# Patient Record
Sex: MALE | Race: BLACK OR AFRICAN AMERICAN | Employment: FULL TIME | URBAN - METROPOLITAN AREA
[De-identification: names, ages, dates, MRNs, and addresses within clinical notes are randomized per-mention and may not be internally consistent; named-entity substitution may affect disease eponyms.]

---

## 2018-04-08 ENCOUNTER — TRANSFERRED RECORDS (OUTPATIENT)
Dept: HEALTH INFORMATION MANAGEMENT | Facility: CLINIC | Age: 28
End: 2018-04-08

## 2018-04-08 LAB
CREAT SERPL-MCNC: 0.83 MG/DL (ref 0.7–1.3)
GFR SERPL CREATININE-BSD FRML MDRD: >60 ML/MIN/1.73M2
GLUCOSE SERPL-MCNC: 214 MG/DL (ref 70–125)
POTASSIUM SERPL-SCNC: 3.7 MMOL/L (ref 3.5–5)
TSH SERPL-ACNC: 1.41 UIU/ML (ref 0.3–5)

## 2018-04-10 ENCOUNTER — HOSPITAL ENCOUNTER (INPATIENT)
Facility: CLINIC | Age: 28
LOS: 2 days | Discharge: HOME | DRG: 885 | End: 2018-04-12
Attending: PSYCHIATRY & NEUROLOGY | Admitting: PSYCHIATRY & NEUROLOGY
Payer: COMMERCIAL

## 2018-04-10 DIAGNOSIS — F25.0 SCHIZOAFFECTIVE DISORDER, BIPOLAR TYPE (H): ICD-10-CM

## 2018-04-10 DIAGNOSIS — E11.9 TYPE 2 DIABETES MELLITUS WITHOUT COMPLICATION, WITHOUT LONG-TERM CURRENT USE OF INSULIN (H): Primary | ICD-10-CM

## 2018-04-10 DIAGNOSIS — E55.9 VITAMIN D DEFICIENCY: ICD-10-CM

## 2018-04-10 PROBLEM — T14.91XA SUICIDE ATTEMPT (H): Status: ACTIVE | Noted: 2018-04-10

## 2018-04-10 LAB
CHOLEST SERPL-MCNC: 146 MG/DL
CREAT SERPL-MCNC: 0.69 MG/DL (ref 0.66–1.25)
DEPRECATED CALCIDIOL+CALCIFEROL SERPL-MC: 13 UG/L (ref 20–75)
GFR SERPL CREATININE-BSD FRML MDRD: >90 ML/MIN/1.7M2
GLUCOSE BLDC GLUCOMTR-MCNC: 111 MG/DL (ref 70–99)
GLUCOSE BLDC GLUCOMTR-MCNC: 213 MG/DL (ref 70–99)
HBA1C MFR BLD: 7.9 % (ref 0–6.4)
HDLC SERPL-MCNC: 35 MG/DL
LDLC SERPL CALC-MCNC: 74 MG/DL
NONHDLC SERPL-MCNC: 111 MG/DL
TRIGL SERPL-MCNC: 186 MG/DL

## 2018-04-10 PROCEDURE — 36415 COLL VENOUS BLD VENIPUNCTURE: CPT | Performed by: STUDENT IN AN ORGANIZED HEALTH CARE EDUCATION/TRAINING PROGRAM

## 2018-04-10 PROCEDURE — 82565 ASSAY OF CREATININE: CPT | Performed by: STUDENT IN AN ORGANIZED HEALTH CARE EDUCATION/TRAINING PROGRAM

## 2018-04-10 PROCEDURE — 00000146 ZZHCL STATISTIC GLUCOSE BY METER IP

## 2018-04-10 PROCEDURE — 99221 1ST HOSP IP/OBS SF/LOW 40: CPT | Performed by: PHYSICIAN ASSISTANT

## 2018-04-10 PROCEDURE — 99223 1ST HOSP IP/OBS HIGH 75: CPT | Mod: AI | Performed by: PSYCHIATRY & NEUROLOGY

## 2018-04-10 PROCEDURE — 90853 GROUP PSYCHOTHERAPY: CPT

## 2018-04-10 PROCEDURE — 93005 ELECTROCARDIOGRAM TRACING: CPT

## 2018-04-10 PROCEDURE — 80061 LIPID PANEL: CPT | Performed by: STUDENT IN AN ORGANIZED HEALTH CARE EDUCATION/TRAINING PROGRAM

## 2018-04-10 PROCEDURE — 12400007 ZZH R&B MH INTERMEDIATE UMMC

## 2018-04-10 PROCEDURE — 25000132 ZZH RX MED GY IP 250 OP 250 PS 637

## 2018-04-10 PROCEDURE — 82306 VITAMIN D 25 HYDROXY: CPT | Performed by: STUDENT IN AN ORGANIZED HEALTH CARE EDUCATION/TRAINING PROGRAM

## 2018-04-10 PROCEDURE — 83036 HEMOGLOBIN GLYCOSYLATED A1C: CPT | Performed by: STUDENT IN AN ORGANIZED HEALTH CARE EDUCATION/TRAINING PROGRAM

## 2018-04-10 PROCEDURE — 25000132 ZZH RX MED GY IP 250 OP 250 PS 637: Performed by: STUDENT IN AN ORGANIZED HEALTH CARE EDUCATION/TRAINING PROGRAM

## 2018-04-10 PROCEDURE — 99207 ZZC CONSULT E&M CHANGED TO INITIAL LEVEL: CPT | Performed by: PHYSICIAN ASSISTANT

## 2018-04-10 RX ORDER — DEXTROSE MONOHYDRATE 25 G/50ML
25-50 INJECTION, SOLUTION INTRAVENOUS
Status: DISCONTINUED | OUTPATIENT
Start: 2018-04-10 | End: 2018-04-12 | Stop reason: HOSPADM

## 2018-04-10 RX ORDER — ACETAMINOPHEN 325 MG/1
650 TABLET ORAL EVERY 4 HOURS PRN
Status: DISCONTINUED | OUTPATIENT
Start: 2018-04-10 | End: 2018-04-12 | Stop reason: HOSPADM

## 2018-04-10 RX ORDER — NICOTINE POLACRILEX 4 MG
15-30 LOZENGE BUCCAL
Status: DISCONTINUED | OUTPATIENT
Start: 2018-04-10 | End: 2018-04-12 | Stop reason: HOSPADM

## 2018-04-10 RX ORDER — OLANZAPINE 10 MG/2ML
10 INJECTION, POWDER, FOR SOLUTION INTRAMUSCULAR
Status: DISCONTINUED | OUTPATIENT
Start: 2018-04-10 | End: 2018-04-11

## 2018-04-10 RX ORDER — ARIPIPRAZOLE 10 MG/1
10 TABLET ORAL DAILY
Status: DISCONTINUED | OUTPATIENT
Start: 2018-04-10 | End: 2018-04-11

## 2018-04-10 RX ORDER — OLANZAPINE 10 MG/1
10 TABLET ORAL
Status: DISCONTINUED | OUTPATIENT
Start: 2018-04-10 | End: 2018-04-11

## 2018-04-10 RX ORDER — HYDROXYZINE HYDROCHLORIDE 25 MG/1
25 TABLET, FILM COATED ORAL EVERY 4 HOURS PRN
Status: DISCONTINUED | OUTPATIENT
Start: 2018-04-10 | End: 2018-04-12 | Stop reason: HOSPADM

## 2018-04-10 RX ORDER — ARIPIPRAZOLE 5 MG/1
5 TABLET ORAL AT BEDTIME
Status: DISCONTINUED | OUTPATIENT
Start: 2018-04-10 | End: 2018-04-10

## 2018-04-10 RX ORDER — ERGOCALCIFEROL 1.25 MG/1
50000 CAPSULE, LIQUID FILLED ORAL
Status: DISCONTINUED | OUTPATIENT
Start: 2018-04-11 | End: 2018-04-12 | Stop reason: HOSPADM

## 2018-04-10 RX ORDER — AMLODIPINE BESYLATE 5 MG/1
5 TABLET ORAL DAILY
Status: DISCONTINUED | OUTPATIENT
Start: 2018-04-10 | End: 2018-04-11

## 2018-04-10 RX ADMIN — METFORMIN HYDROCHLORIDE 500 MG: 500 TABLET ORAL at 08:53

## 2018-04-10 RX ADMIN — METFORMIN HYDROCHLORIDE 500 MG: 500 TABLET ORAL at 17:42

## 2018-04-10 RX ADMIN — AMLODIPINE BESYLATE 5 MG: 5 TABLET ORAL at 08:52

## 2018-04-10 RX ADMIN — ARIPIPRAZOLE 10 MG: 10 TABLET ORAL at 17:42

## 2018-04-10 NOTE — PROGRESS NOTES
Initially seen by OT on this date. Jae attended 2 of 2 OT groups today. He participated in a Health and Wellbeing discussion group this a.m. on self-care activities to promote ongoing recovery. Pt.completed a worksheet and participated in a group discussion. Engaged in the discussion, thoughtful and relevant responses. Will be given a written self-assessment upon increased group attendance. More observation needed to complete initial evaluation at this time.

## 2018-04-10 NOTE — CONSULTS
Internal Medicine Initial Visit    Jae Winter MRN# [id number]   Age: 27 year old YOB: 1990   Date of Admission: 4/10/2018    ADMIT DATE: 4/10/2018  DATE OF CONSULT: 4/10/2018    PCP: No primary care provider on file.    REQUESTING SERVICE: psychiatry  REASON FOR CONSULT: DM2, HTN- assess and make recommendations    CHIEF COMPLAINT: Depression    HPI: Jae Winter is a 27 year old male with a past medical history of DM2, HTN, PILLO, depression, schizoaffective d/o bipolar type who is admitted to [facility] after suicide attempt w/ carbon monoxide poisoning. Initially evaluated in [facility] ED and medically cleared.    The patient notes he stopped taking his medications 3 months ago as one of them made him feel drowsy and he needs to be alert to drive. He notes that he just stopped all of his medications. He doesn't frequently check his BG. Denies any new medical concerns. He denies chest pain, dyspnea, abdominal pain, nausea/vomiting, dysuria.     ROS:   10 point ROS asked and otherwise negative, with exceptions as noted above in HPI.     PMH:  HTN  DM2  Bipolar Disorder    PSH:  No past surgical history on file.    MEDICATIONS    No current facility-administered medications on file prior to encounter.   No current outpatient prescriptions on file prior to encounter.    ALLERGIES:   No Known Allergies    FAMILY HISTORY:  Reviewed and updated in [employer].     SOCIAL HISTORY:  Social History     Social History     Marital status:      Spouse name: N/A     Number of children: N/A     Years of education: N/A     Social History Main Topics     Smoking status: Not on file     Smokeless tobacco: Not on file     Alcohol use Not on file     Drug use: Not on file     Sexual activity: Not on file     Other Topics Concern     Not on file     Social History Narrative   Denies smoking, occasional alcohol use, denies drug use.     PHYSICAL EXAM:  Blood pressure 138/73, pulse 74, temperature 96.6  F (35.9  " C), resp. rate 14, height 1.854 m (6' 1\"), weight 88.7 kg (195 lb 8 oz), SpO2 99 %.    GENERAL: Alert and orientated x 3. Appears in no acute distress.   HEENT: Anicteric sclera. Mucous membranes moist and without lesions.   CV: RRR, S1S2, no murmurs appreciated.  RESPIRATORY: Respirations regular, even, and unlabored. Lungs CTAB with no wheezing, rales, rhonchi.   GI: Soft and non distended with normoactive bowel sounds present in all quadrants. No tenderness, rebound, guarding.   EXTREMITIES: No peripheral edema. 2+ bilateral pedal pulses.   NEUROLOGIC: CN II-XII grossly intact. No focal deficits.   MUSCULOSKELETAL: No joint swelling or tenderness.   SKIN: No jaundice. No rashes or lesions.     LABS:  CMP    Recent Labs  Lab 04/10/18  0825   CR 0.69   GFRESTIMATED >90   GFRESTBLACK >90     CBCNo lab results found in last 7 days.  INRNo lab results found in last 7 days.    IMAGING: None to review from this admission    ASSESSMENT & RECOMMENDATIONS:  #Depression, anxiety, Schizoaffective disorder bipolar type: Utox negative at OSH.   -Management per psychiatry  #Suicide attempt w/ carbon monoxide poisoning: OSH labs w/ carboxyhemoglobin of 2.2%. Cleared [facility] ED. Mentation appropriate. Management per psychiatry.  #DM2: PTA maintained on Metformin 500 mg BID. BG of 125 prior to transfer, 111 today. A1C 6.9 in 07/2017, 7.9 today, notes no medications for >3 months.   -Restart metformin at 500 mg BID  -BID BG checks  -Notify medicine if persistently >180, can increase metformin to 1000 mg BID  #HTN: PTA maintained on amlodipine 5 mg. Previously also prescribed chlorthalidone 25 mg, lisinopril 20 mg qday but no longer taking. /73 on admission. Goal <130/80 due to DM2.   -Continue PTA medication  -Notify medicine if BP persistently >150/90   -If BP elevated will consider addition of ACE  #Dyslipidemia: TG of 186, tchol of 146, LDL of 74, HDL 35. F/U     I appreciate the opportunity to participate in the care of " this patient. Medicine will sign off, please notify on call DU if any intercurrent medical issues arise.     Yuni Chavez PA-C
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
## 2018-04-10 NOTE — PLAN OF CARE
Problem: Patient Care Overview  Goal: Team Discussion  Team Plan:   BEHAVIORAL TEAM DISCUSSION    Participants:  Daisy Mclaughlin [facility], Dr. Cali Doherty MD, Kevin Vogel MD, Adela Funez RN  Progress: Initiated with hospitalization   Continued Stay Criteria/Rationale: Admitted after calling the crisis line during a suicide attempt in the context of non adherence to medication and psychosocial stressors.   Medical/Physical: See H&P  Precautions:   Behavioral Orders   Procedures     Code 1 - Restrict to Unit     Routine Programming     As clinically indicated     Self Injury Precaution     Status 15     Every 15 minutes.     Suicide precautions     Patients on Suicide Precautions should have a Combination Diet ordered that includes a Diet selection(s) AND a Behavioral Tray selection for Safe Tray - with utensils, or Safe Tray - NO utensils       Plan: Continue to monitor patient's symptoms as a medication regiment is initiated and document changes until proper discharge is set in place.  Rationale for change in precautions or plan: No change has been indicated.

## 2018-04-10 NOTE — PLAN OF CARE
"Problem: Depressive Symptoms  Goal: Depressive Symptoms  Signs and symptoms of listed problems will be absent or manageable.  Outcome: No Change  Marcos is a 27 year old male admitted to [facility] from [facility] via EMS for suicide attempt by means of attempted carbon monoxide poisoning. Pt voluntary. Search completed. VSS. Pt calm, cooperative. Presents sad with flat depressed affect.     Pt admitted following attempting suicide this past evening but reports regretting attempt at this time, stating he was \"just overwhelmed, stressed, and did something stupid\". Pt denies SI at admission. Pt reports previous history of suicide attempt, unknown number however greater than 5, most occurring in childhood ages [age] by means of drinking bleach, overdosing on pills, and jumping out a second story window. Pt reports suicide attempt in 2015 by means of carbon monoxide poisoning similar to this past evening. Pt denies past mental health history, however per report pt has hx of schizoaffective bipolar type, anxiety, and depression. Pt denied past  admissions, however report indicated mental health admission at [facility] in 2015 and hx of commitment from March to April 2016. Pt quit taking all his medications several months ago due to their cost (not covered by insurance) and feeling drowsy, which he cannot experience due to being a .     Pt reports current stressors of his landlord selling the property the reside in, his wife being laid off and being pregnant with their second child (has 1 year old at home) and unable to find work due to being pregnant, baby due next month. Pt reports that he is unable to find housing in a safe neighborhood due to his single income and poor credit. Pt reports several friends recently dying or going to retirement. Pt states he has been hinting about his SI but no one is listening or understanding. Pt also reports that he has told his wife he is suicidal, however she does not know " how to respond.     NKDA. UTOX negative. Pt has diabetes type two, controlled when taking his metformin. Pt has not had influenza vaccine and declines at this time.     Pt concerned with what to tell his employer in regards to his current hospitalization, not wanting to disclose reason. Pt encouraged to discuss discrete note from MD.

## 2018-04-10 NOTE — PROGRESS NOTES
"  ----------------------------------------------------------------------------------------------------------  [facility], [place]   Psychiatric Progress Note  Hospital Day #0     Interim History:   The patient's care was discussed with the treatment team and chart notes were reviewed.  Sleep: Patient admitted early this morning  PRNs: None    Staff report: Visible and attending groups. Keeps to self. Appetite is good. No thoughts of harming himself.     Patient interview: Patient was seen in the AM in the conference room. Patient was pleasant and cooperative.    Patient explained the circumstances surrounding his admission. He reports several months of depression and increased \"life stressors.\" He states that he has been stressed because he and his wife are expecting another baby in a month, they are being forced to move by June because the [employer] is selling the property, and his wife is being laid off as of May 1st. He describes feeling overwhelmed by life and that he couldn't take the stress anymore. He has felt suicidal for the past several months, but has been able to push the thoughts away. Two nights ago he described not being able to handle his thoughts anymore so he drove his car away from home, put a garden hose from his exhaust pipe and into the car and then taped the window so that the exhaust couldn't leak out. He also drank some alcohol and nyquil with the intent of sleeping through the attempt. Marcos says he became frustrated that the attempt wasn't working, and that at a certain point he called the crisis hotline. He is now glad he survived the attempt and has no current thoughts of suicide or desire to die. He also taped a video on his phone \"venting about life\" which he likens to a suicide note of sorts. He later deleted it. His biggest motivator to be alive is his family. Over the past several months he notes feelings of sadness, anhedonia, feeling tired with no energy, " "and poor self esteem. He loves his job as a , but lately has not felt motivation to get up and go to work. His hobbies include music, singing, and rapping, however, he has not gotten any pleasure from these lately. He put a rap video online last week which got good reviews, so that made him happy briefly, but then he started having low self esteem again. He says \"I don't even like looking at myself in the mirror.\" He feels bad about himself physically and emotionally. He thinks he has been stress eating over the past several months and reports a weight gain of 50-60 lbs, and then recently a deliberate weight loss of 10-20 lbs. He says his sleep has been fine-has not noted an increase or decrease in sleep. Marcos says he has been sharing with his wife and brother that he is depressed, but that they are not as supportive as he would like, \"they try, but they don't know what to say or how to handle it.\" He has not been taking his medications (wellbutrin, risperdal, blood pressure meds, and metformin) due to sedation. Since he is a  this is a huge issue, \"I can't fall asleep behind the wheel.\" He says he wasn't sure which one was causing sedation so he stopped all of them. He does not have a good relationship with his outpatient provider because he thinks she only cares about medications and doesn't care about him. Previously tried therapy but didn't click well with his therapist.     HPI details his psychiatric history which includes alee and psychosis. He denies any symptoms of psychosis or alee since 2016 and says he is \"embarrassed\" by his previous behavior. He says the hospitalization in 2016 was in the context of heavy marijuana use. He described feeling paranoid and having \"crazy thoughts\" and hallucinations at the time. He also had elevated mood with no sleep, periods of irritability, and irresponsible spending. He says he spent $500 buying things for strangers at the store, \"I was feeling " "very generous.\" He has not had any of these symptoms since then. He believes his mom has bipolar disorder, but has not observed her manic. He says she attempted suicide in 2008. He also has an great aunt who is \"delusional.\"    He is interested in remaining voluntarily hospitalized for medication management and stabilization. He has no thoughts of harming himself or others at this time. We discussed medication options at length including abilify versus lithium.   The risks, benefits, alternatives and side effects of any medication changes have been discussed and are understood by the patient and other caregivers. Ultimately, Marcos decided that he would prefer to go with abilify because of desire to avoid sedation and weight gain.    Collateral: Spoke with patient's wife Audrey with Marcos's permission. She echos many of the same sentiments as Marcos including him being depressed over the past several months. He never made any suicidal statements to her and she was shocked by the attempt. The only strange thing she noted was him texting her \"f**k music, f**k life\" last Friday. When he came home he apologized and said \"the depression took over.\" She states that the increased stress due to her impending unemployment, their needing to move, and expecting a baby have contributed to his mood. She thinks he has taken a lot of the stress on himself to avoid putting stress on her. Her main concern is his having not slept well over the past several months. She says he has only been sleeping a few hours per night \"and he tosses and turns.\" She thinks he can't sleep because of stressful thoughts. Things have also been difficult because he works day shifts and she works night shifts. She shares that he stopped his medication due to sedating side effects and that he didn't think they were helping his mood. She has not observed any psychotic or manic symptoms and does not think he needs the risperdal. She thinks he needs an " "anti-depressant and to get a new therapist and doctor. They were trying to find him a new doctor but were having difficulties with insurance. They have a support system that includes his brother and his mother. His mother recently moved in to help with the childcare.          Psychiatric Examination:   /73  Pulse 70  Temp 96.6  F (35.9  C)  Resp 14  Ht 1.854 m (6' 1\")  Wt 88.7 kg (195 lb 8 oz)  SpO2 99%  BMI 25.79 kg/m2  Weight is 195 lbs 8 oz  Body mass index is 25.79 kg/(m^2).    Appearance:  awake, alert, adequately groomed, dressed in hospital scrubs and appeared as age stated  Attitude:  cooperative  Eye Contact:  good  Mood:  \"I'm depressed\"  Affect:  mood congruent, intensity is normal and full range  Speech:  clear, coherent  Psychomotor Behavior:  no evidence of tardive dyskinesia, dystonia, or tics and intact station, gait and muscle tone  Thought Process:  logical, linear and goal oriented  Associations:  no loose associations  Thought Content:  Denies AH and VH. Denies current SI, but SI was recently present  Insight:  good  Judgment:  intact  Oriented to:  time, person, and place  Attention Span and Concentration:  intact  Recent and Remote Memory:  intact  Language: Conversant in English  Fund of Knowledge: appropriate  Muscle Strength and Tone: normal  Gait and Station: Normal     Assessment    Jae Winter is a 27 year old male with a history of Schizoaffective Disorder, Bipolar Type who presented to the [facility] ED following suicide attempt via carbon monoxide poisoning in the context of worsening depression. The patient's last psychiatric hospitalization was in 2016.  The patient is currently followed by OPP Tali Segura, however has not seen her since August of 2017 . Family history is notable for psychosis in an aunt. Current psychosocial stressors include unstable housing and impending birth of his third child. The patient denies drug abuse or self injurious " behaviors. The MSE is notable for calm, cooperative patient with constricted affect. The patient's reported symptoms of depression, anhedonia and SI are consistent with historic diagnosis of Schizoaffective Disorder, bipolar type, current episode depressed.      Inpatient psychiatric hospitalization is needed due to suicidal ideation with recent suicide attempt.     Hospital course: Jae Winter was admitted to [facility] as a voluntary patient for hospitalization following a suicide attempt. Patient regretted his attempt and was no longer suicidal, but was amenable to stabilization and treatment for depression. After discussing the risk and benefits of medications of abilify and lithium, patient decided on abilify. Patient was started on abilify and titrated up to a final dose of 10 mg.     Medical course: Patient's prior to admission medications that he had not been taking were restarted including metformin and amlodipine. Medicine was consulted for management of his diabetes and hypertension.    Plan     Admit to [facility] with Attending Physician Cali Doherty MD  Legal Status: Voluntary     Safety Assessment:     Behavioral Checks      Routine Programming      Code 1 - Restrict to Unit      Status 15       Medications:   - Abilify 10 mg daily  - Hydroxyzine 25 mg q4 hrs PRN  - Olanzapine 10 mg q2 hrs PRN     Principal psychiatric diagnosis: Schizoaffective Disorder, bipolar type, current episode depressed vs bipolar disorder current episode depressed  - Patient will be treated in therapeutic milieu with appropriate individual and group therapies.     Medical diagnoses:    # S/p suicide attempt w/carbon monoxide poisoning:  Carboxyhemoglobin of 2.2% at outside hospital. Medically cleared for admission    # DMII:   A1C of 7.9 on admission in the context of medication nonadherence  - Continue Metformin 500 mg bid with meals  - BIG BG checks  - Will notify medicine if persistently >180    # HTN:  - Continue PTA  amlodipine 5 mg daily  - Will notify medicine if BP persistently >150/90    # Vitamin D deficiency:  Vitamin D of 13 on admission  - Start ergocalciferol 50,000 IUs weekly  - Labs: Hemoglobin A1c 7.9, vitamin D low at 13, TGs elevated at 186    # Dyslipidemia:  Cholesterol 146, triglycerides 186, HDL 35, and LDL 74  - F/U as outpatient with PCP       Consults: Internal Medicine        Dispo: unknown pending medication management and clinical stabilization    Safety Assessment:   Behavioral Orders   Procedures     Code 1 - Restrict to Unit     Routine Programming     As clinically indicated     Self Injury Precaution     Status 15     Every 15 minutes.     Suicide precautions     Patients on Suicide Precautions should have a Combination Diet ordered that includes a Diet selection(s) AND a Behavioral Tray selection for Safe Tray - with utensils, or Safe Tray - NO utensils       -------------------------------------------------------------------------------------  IAntonia, MS3, acted as a scribe for Dr. Kevin Vogel, PGY1.    I have reviewed and edited the documentation recorded by the scribe.  This documentation accurately reflects the services I personally performed and treatment decisions made by me in consultation with the attending physician Cali Doherty MD.    Kevin Vogel MD  Psychiatry PGY-1  [phone]      Attestation:  I, Cali Doherty, have personally performed an examination of this patient and I have reviewed the resident's documentation.  I have edited the note to reflect all relevant changes.  I have discussed this patient with the house staff on 4/10/2018.  I agree with resident findings and plan in today's note and yesterdays resident H&P.  I have reviewed all vitals and laboratory findings.      I certifiy that the inpatient services were ordered in accordance with the Medicare regulations governing the order. This includes certification that hospital inpatient services are reasonable  and necessary and in the case of services not specified as inpatient-only under 42 .22(n), that they are appropriately provided as inpatient services in accordance with the 2-midnight benchmark under 42 .3(e).     The reason for inpatient status is Suicide attempt.    Cali Doherty MD             Labs/Imaging since admission:         Lipid panel   Result Value Ref Range    Cholesterol 146 <200 mg/dL    Triglycerides 186 (H) <150 mg/dL    HDL Cholesterol 35 (L) >39 mg/dL    LDL Cholesterol Calculated 74 <100 mg/dL    Non HDL Cholesterol 111 <130 mg/dL   Vitamin D   Result Value Ref Range    Vitamin D Deficiency screening 13 (L) 20 - 75 ug/L   Hemoglobin A1c   Result Value Ref Range    Hemoglobin A1C 7.9 (H) 0 - 6.4 %   Creatinine   Result Value Ref Range    Creatinine 0.69 0.66 - 1.25 mg/dL    GFR Estimate >90 >60 mL/min/1.7m2    GFR Estimate If Black >90 >60 mL/min/1.7m2   Glucose by meter   Result Value Ref Range    Glucose 111 (H) 70 - 99 mg/dL   EKG 12-lead, complete   Result Value Ref Range    Interpretation ECG Click View Image link to view waveform and result

## 2018-04-10 NOTE — PROGRESS NOTES
Patient visible and is attending groups. Appetite good. Offers no complaints. Keeping to self.         04/10/18 1417   Behavioral Health   Hallucinations denies / not responding to hallucinations   Thinking distractable   Orientation person: oriented;place: oriented   Memory baseline memory   Insight admits / accepts   Judgement impaired   Eye Contact at examiner   Affect blunted, flat   Mood anxious   Physical Appearance/Attire attire appropriate to age and situation;neat   Hygiene (Patient showered today.)   1. Wish to be Dead No   2. Non-Specific Active Suicidal Thoughts  No   Activity (Acclimating to unit.)   Speech clear;coherent   Safety   Suicidality Status 15

## 2018-04-10 NOTE — PROGRESS NOTES
Clinical Nutrition Services Brief Note     Jae is a 27 year old male seen by the dietitian today due to a positive admission nutrition risk screen for reduced oral intake over the past month.     Nutrition History:  Pt reports he does not remember telling any staff members on admit that he had a poor appetite. He reports at home he typically watches how much he eats and had been losing weight intentionally. He states sometimes it is a challenge to eat well for his type 2 diabetes because his family isn't always very supportive.     Nutrition Intervention:  Nutrition education - discussed intakes PTA and currently. Pt reports he is doing well and has no concerns.     Nutrition Follow-up/Monitoring:  RD to sign off as no nutrition follow-up warranted at this time.  Please consult if further needs arise prior to discharge.    Breanne Tovar RD, LD  Pager #[phone]

## 2018-04-10 NOTE — PROGRESS NOTES
04/10/18 0537   Patient Belongings   Did you bring any home meds/supplements to the hospital?  Yes  (Nurse to verify all medication)   Disposition of meds  Sent to security/pharmacy per site process   Patient Belongings clothing;shoes   Disposition of Belongings Locker   Belongings Search Yes   Clothing Search Yes   Second Staff Roberto and Dheeraj LATHAM   General Info Comment Patient came in with a shelly pant, sweater, tank top, alana shirt, sock, and tennis shoe. There was no money, no wallet, no cell phone and no ID found in belongings.   A               Admission:  I am responsible for any personal items that are not sent to the safe or pharmacy.  [facility] is not responsible for loss, theft or damage of any property in my possession.    Signature:  _________________________________ Date: _______  Time: _____                                              Staff Signature:  ____________________________ Date: ________  Time: _____      2nd Staff person, if patient is unable/unwilling to sign:    Signature: ________________________________ Date: ________  Time: _____     Discharge:  [facility] has returned all of my personal belongings:    Signature: _________________________________ Date: ________  Time: _____                                          Staff Signature:  ____________________________ Date: ________  Time: _____

## 2018-04-10 NOTE — IP AVS SNAPSHOT
MRN:[id number]                      After Visit Summary   4/10/2018    Jae Winter    MRN: [id number]           Thank you!     Thank you for choosing [facility] for your care. Our goal is always to provide you with excellent care.        Patient Information     Date Of Birth          1990        Designated Caregiver       Most Recent Value    Caregiver    Will someone help with your care after discharge? no      About your hospital stay     You were admitted on:  April 10, 2018 You last received care in the:  [facility]    You were discharged on:  April 12, 2018       Who to Call     For medical emergencies, please call 911.  For non-urgent questions about your medical care, please call your primary care provider or clinic, None          Attending Provider     Provider Specialty    Cali Doherty MD Psychiatry       Primary Care Provider    None Specified      Further instructions from your care team        Behavioral Discharge Planning and Instructions      Summary:  You were admitted on 4/10/2018  due to Suicide Attempt.  You were treated by Dr. Cali Doherty MD and discharged on 4/12/18 from [facility] to Home    Principal Diagnosis: Schizoaffective Disorder     Health Care Follow-up Appointments:     Dr. Turner- Tuesday April 17th 3:00pm (Psychiatry)   [facility]  [address] [place] [address] [address] [place] [address]  Phone:  [phone] Fax:  [phone]     Mr. Mckenzie- Thursday April 19th 2:05pm (Primary Care Doctor)  [facility]    [address]nd [place] [address]   Ph: [phone] Fax:    Attend all scheduled appointments with your outpatient providers. Call at least 24 hours in advance if you need to reschedule an appointment to ensure continued access to your outpatient providers.   Major Treatments, Procedures and Findings:  You were provided with: a psychiatric assessment, medication evaluation and/or management, group  "therapy and milieu management    Symptoms to Report: increased confusion, losing more sleep, mood getting worse or thoughts of suicide    Early warning signs can include: increased depression or anxiety increased thoughts or behaviors of suicide or self-harm  increased unusual thinking, such as paranoia or hearing voices    Safety and Wellness:  Take all medicines as directed.  Make no changes unless your doctor suggests them.      Follow treatment recommendations.  Refrain from alcohol and non-prescribed drugs.  If there is a concern for safety, call 911.    Resources:   Crisis Intervention: 424.953.2186 or 089-510-9856 (TTY: 818.113.9826).  Call anytime for help.  National [place] on Mental Illness (www.mn.nilsa.org): 253.157.4648 or 879-293-5457.  Suicide Awareness Voices of Education (SAVE) (www.save.org): 200-170-GTVE (1511)  National Suicide Prevention Line (www.mentalhealthmn.org): 135-336-TYPU (9052)  Mental Health Consumer/Survivor Network of [place] (www.mhcsn.net): 517.984.2067 or 393-759-4635  Mental Health Association of [place] (www.mentalhealth.org): 180.496.4710 or 361-538-0922  [facility] Crisis Response - Adult [phone]  Text 4 Life: txt \"LIFE\" to 10106 for immediate support and crisis intervention  Crisis text line: Text \"[place]\" to 121732. Free, confidential, 24/7.  Crisis Intervention: 478.904.7888 or 190-953-5213. Call anytime for help.   [facility] Mental Health Crisis Response Team - Child: [phone]    The treatment team has appreciated the opportunity to work with you.     If you have any questions or concerns our unit number is [phone]  You may be receiving a follow-up phone call within the next three days from a representative from behavioral health.            Pending Results     No orders found from 4/8/2018 to 4/11/2018.            Admission Information     Date & Time Provider Department Dept. Phone    4/10/2018 Cali Doherty MD [facility] [phone]      Your " "Vitals Were     Blood Pressure Pulse Temperature Respirations Height Weight    96/42 61 97.5  F (36.4  C) 12 1.854 m (6' 1\") 89.1 kg (196 lb 8 oz)    Pulse Oximetry BMI (Body Mass Index)                99% 25.93 kg/m2          Myntra Information     Myntra lets you send messages to your doctor, view your test results, renew your prescriptions, schedule appointments and more. To sign up, go to [email or web address] . Click on \"Log in\" on the left side of the screen, which will take you to the Welcome page. Then click on \"Sign up Now\" on the right side of the page.     You will be asked to enter the access code listed below, as well as some personal information. Please follow the directions to create your username and password.     Your access code is: [email or web address]  Expires: 2018  1:22 PM     Your access code will  in 90 days. If you need help or a new code, please call your [facility] clinic or [phone].        Care EveryWhere ID     This is your Care EveryWhere ID. This could be used by other organizations to access your [facility] medical records  [id number]        Equal Access to Services     MIGEL LIN AH: Tanisha Foley, waaxda luqadaha, qaybta kaalmada adeegyada, yu serrano. So [facility] [phone].    ATENCIÓN: Si habla español, tiene a thibodeaux disposición servicios gratuitos de asistencia lingüística. Llame al [phone].    We comply with applicable federal civil rights laws and [place] laws. We do not discriminate on the basis of race, color, national origin, age, disability, sex, sexual orientation, or gender identity.               Review of your medicines      START taking        Dose / Directions    ARIPiprazole 5 MG tablet   Commonly known as:  ABILIFY   Used for:  Schizoaffective disorder, bipolar type (H)        Dose:  5 mg   Start taking on:  2018   Take 1 tablet (5 mg) by mouth daily   Quantity:  30 tablet   Refills:  0       DRISDOL " 05774 units Caps   Used for:  Vitamin D deficiency        Dose:  21498 Units   Start taking on:  4/18/2018   Take 50,000 Units by mouth every 7 days   Quantity:  7 capsule   Refills:  0       metFORMIN 500 MG tablet   Commonly known as:  GLUCOPHAGE   Used for:  Type 2 diabetes mellitus without complication, without long-term current use of insulin (H)        Dose:  500 mg   Take 1 tablet (500 mg) by mouth 2 times daily (with meals)   Quantity:  60 tablet   Refills:  0            Where to get your medicines      These medications were sent to [facility] [place], [place] - [address]  [address] [address]     Phone:  [phone]     ARIPiprazole 5 MG tablet    DRISDOL 28134 units Caps    metFORMIN 500 MG tablet                Protect others around you: Learn how to safely use, store and throw away your medicines at www.disposemymeds.org.             Medication List: This is a list of all your medications and when to take them. Check marks below indicate your daily home schedule. Keep this list as a reference.      Medications           Morning Afternoon Evening Bedtime As Needed    ARIPiprazole 5 MG tablet   Commonly known as:  ABILIFY   Take 1 tablet (5 mg) by mouth daily   Start taking on:  4/13/2018   Last time this was given:  5 mg on 4/12/2018  8:55 AM                                DRISDOL 04230 units Caps   Take 50,000 Units by mouth every 7 days   Start taking on:  4/18/2018   Last time this was given:  50,000 Units on 4/11/2018  9:25 AM                                metFORMIN 500 MG tablet   Commonly known as:  GLUCOPHAGE   Take 1 tablet (500 mg) by mouth 2 times daily (with meals)   Last time this was given:  500 mg on 4/12/2018  8:55 AM

## 2018-04-10 NOTE — IP AVS SNAPSHOT
[place] [id number]    [place] [address]    Phone:  [phone]                                       After Visit Summary   4/10/2018    Jae Winter    MRN: [id number]           After Visit Summary Signature Page     I have received my discharge instructions, and my questions have been answered. I have discussed any challenges I see with this plan with the nurse or doctor.    ..........................................................................................................................................  Patient/Patient Representative Signature      ..........................................................................................................................................  Patient Representative Print Name and Relationship to Patient    ..................................................               ................................................  Date                                            Time    ..........................................................................................................................................  Reviewed by Signature/Title    ...................................................              ..............................................  Date                                                            Time

## 2018-04-11 LAB
FOLATE SERPL-MCNC: 16.6 NG/ML
GLUCOSE BLDC GLUCOMTR-MCNC: 128 MG/DL (ref 70–99)
GLUCOSE BLDC GLUCOMTR-MCNC: 139 MG/DL (ref 70–99)
GLUCOSE BLDC GLUCOMTR-MCNC: 98 MG/DL (ref 70–99)
INTERPRETATION ECG - MUSE: NORMAL
VIT B12 SERPL-MCNC: 804 PG/ML (ref 193–986)

## 2018-04-11 PROCEDURE — 12400007 ZZH R&B MH INTERMEDIATE UMMC

## 2018-04-11 PROCEDURE — 99232 SBSQ HOSP IP/OBS MODERATE 35: CPT | Mod: GC | Performed by: PSYCHIATRY & NEUROLOGY

## 2018-04-11 PROCEDURE — 36415 COLL VENOUS BLD VENIPUNCTURE: CPT

## 2018-04-11 PROCEDURE — 25000132 ZZH RX MED GY IP 250 OP 250 PS 637: Performed by: STUDENT IN AN ORGANIZED HEALTH CARE EDUCATION/TRAINING PROGRAM

## 2018-04-11 PROCEDURE — 82607 VITAMIN B-12: CPT

## 2018-04-11 PROCEDURE — 25000132 ZZH RX MED GY IP 250 OP 250 PS 637

## 2018-04-11 PROCEDURE — 82746 ASSAY OF FOLIC ACID SERUM: CPT

## 2018-04-11 PROCEDURE — 00000146 ZZHCL STATISTIC GLUCOSE BY METER IP

## 2018-04-11 PROCEDURE — 90853 GROUP PSYCHOTHERAPY: CPT

## 2018-04-11 RX ORDER — ARIPIPRAZOLE 5 MG/1
5 TABLET ORAL DAILY
Status: DISCONTINUED | OUTPATIENT
Start: 2018-04-11 | End: 2018-04-12 | Stop reason: HOSPADM

## 2018-04-11 RX ADMIN — METFORMIN HYDROCHLORIDE 500 MG: 500 TABLET ORAL at 18:06

## 2018-04-11 RX ADMIN — ARIPIPRAZOLE 5 MG: 5 TABLET ORAL at 11:19

## 2018-04-11 RX ADMIN — ERGOCALCIFEROL 50000 UNITS: 1.25 CAPSULE ORAL at 09:25

## 2018-04-11 RX ADMIN — METFORMIN HYDROCHLORIDE 500 MG: 500 TABLET ORAL at 09:27

## 2018-04-11 ASSESSMENT — ACTIVITIES OF DAILY LIVING (ADL)
DRESS: INDEPENDENT
GROOMING: INDEPENDENT
ORAL_HYGIENE: INDEPENDENT

## 2018-04-11 NOTE — PROGRESS NOTES
"Observed to have slept well till almost 0200 at which time pt. Came up to nurses station requesting to use another bathroom so as not to disturb his roommate.  Reassured that using his own bathroom was appropriate. Further reported restlessness,  and  feeling dizzy when he got up and also felt that his heart was beating fast.  V/S's taken and recorded.   B/P 100/49 sitting, 96/42 standing, P 61.  BG = 128.  Drank full cup to water (240 cc's).  Endorses that he has been undergoing \"a lot of medication changes lately - I can't even keep up w/them\".  Supportive intervention provided as appropriate.  Dizziness readily resolved.  Back to room and back to bed.  Will continue to monitor.   "

## 2018-04-11 NOTE — PROGRESS NOTES
"   04/11/18 1218   Behavioral Health   Hallucinations denies / not responding to hallucinations   Thinking distractable   Orientation place: oriented;date: oriented   Insight admits / accepts   Judgement impaired   Eye Contact at examiner   Affect other (see comments)  (Neutral)   Mood mood is calm   Physical Appearance/Attire attire appropriate to age and situation   Hygiene well groomed   Suicidality safety plan  (Feels comfortable to notify staff if it changes)   1. Wish to be Dead No   2. Non-Specific Active Suicidal Thoughts  No   Self Injury other (see comment)  (None observed or reported)   Elopement Statements about wanting to leave   Activity other (see comment)  (Active)   Speech clear;coherent   Medication Sensitivity other (see comment)  (\"groggy,\" in the morning )   Psychomotor / Gait balanced;steady     Pt was up and visible throughout the shift. Affect looked neutral. Calm and controlled behavior. Appearance is appropriate to his situation. Observed to participate in community meeting, and OT groups. During 1:1, pt expressed his head felt, \"groggy,\" in the morning because of his new, \"anti depressant,\" medication. Goal to speak with providers about this. Denies thoughts to hurt himself or others. States he is comfortable to let staff know if he feels unsafe.   "

## 2018-04-11 NOTE — PROGRESS NOTES
"  ----------------------------------------------------------------------------------------------------------  [facility], [place]   Psychiatric Progress Note  Hospital Day #1     Interim History:   The patient's care was discussed with the treatment team and chart notes were reviewed.  Sleep: 6  PRNs: None    Staff report: Visible and attending groups. Woke up at 0200 with dizziness and was found to have a blood pressure of 100/49. Dizziness resolved with drinking some water and returning to bed.     Patient interview: Patient was seen in the AM in the conference room. Patient was pleasant and cooperative.    He shared that he had some bothersome dizziness last night, which he attributes to the abilify. Because of this, he declined this AM abilify and amlodipine. He was agreeable to trying abilify again at a lower dose. His mood has been \"mellow\" and \"fine\". He affect appeared brighter this morning, and he smiled at times. At this point, he feels that he is just waiting for the next step. He attended some groups yesterday, and he has been getting along okay on the unit. He denies any current SI, and he regrets his suicide attempt. He felt hopeless previously, but this is not getting better. His wife and him have been working on finding new housing. He slept poorly last night. He also felt restless, but this has improved. Appetite is okay.            Psychiatric Examination:   BP 96/42  Pulse 61  Temp 97.5  F (36.4  C) (Oral)  Resp 16  Ht 1.854 m (6' 1\")  Wt 88.7 kg (195 lb 8 oz)  SpO2 99%  BMI 25.79 kg/m2  Weight is 195 lbs 8 oz  Body mass index is 25.79 kg/(m^2).    Appearance:  awake, alert, adequately groomed and appeared as age stated  Attitude:  cooperative  Eye Contact:  good  Mood:  \"mellow\"\"fine\"  Affect:  mood congruent, intensity is normal and full range, smiling at times  Speech:  clear, coherent  Psychomotor Behavior:  No psychomotor slowing  Thought Process:  logical, " linear and goal oriented  Associations:  no loose associations  Thought Content:  Denies AH and VH. Denies current SI  Insight:  good  Judgment:  intact  Oriented to:  time, person, and place  Attention Span and Concentration:  intact  Recent and Remote Memory:  intact  Language: Conversant in English  Fund of Knowledge: appropriate  Muscle Strength and Tone: normal  Gait and Station: Normal     Assessment    Diagnostic Formulation: Jae Winter is a 27 year old male with a history of Schizoaffective Disorder, Bipolar Type who presented to the [facility] ED following suicide attempt via carbon monoxide poisoning in the context of worsening depression. The patient's last psychiatric hospitalization was in 2016. He has a history of schizoaffective disorder bipolar type, which was diagnosed after an episode of alee with psychosis. However, this was in the context of THC use, which complicates the diagnostic picture. He has no history of psychosis since this initial episode. The patient is currently followed by OPP Tali Segura, however has not seen her since August of 2017 . Family history is notable for psychosis in an aunt. Current psychosocial stressors include unstable housing, wife losing her job, and impending birth of his third child. The patient denies drug abuse or self injurious behaviors. The MSE is notable for calm, cooperative patient with constricted affect. The patient's reported symptoms of depression, anhedonia and SI are consistent with historic diagnosis of Schizoaffective Disorder, bipolar type, current episode depressed.    Inpatient psychiatric hospitalization is needed due to recent suicidal ideation s/p suicide attempt.     Hospital course: Jae Winter was admitted to [facility] as a voluntary patient for hospitalization following a suicide attempt. Patient regretted his attempt and was no longer suicidal, but was amenable to stabilization and treatment for depression. After  discussing the risk and benefits of medications of abilify and lithium, patient decided on abilify. He experienced hypotension on abilify 10 mg, so this was decreased to 5 mg daily.     Medical course: Patient's prior to admission medications that he had not been taking were restarted including metformin and amlodipine. Medicine was consulted for management of his diabetes and hypertension. He had some hypotension to after starting abilify, so amlodipine was discontinued.     Plan     Admit to Unit [facility] with Attending Physician Cali Doherty MD  Legal Status: Voluntary     Safety Assessment:     Behavioral Checks      Routine Programming      Code 1 - Restrict to Unit      Status 15       Medications:   - Abilify 5 mg daily  - Hydroxyzine 25 mg q4 hrs PRN     Principal psychiatric diagnosis: Schizoaffective Disorder, bipolar type, current episode depressed vs bipolar disorder current episode depressed  - Patient will be treated in therapeutic milieu with appropriate individual and group therapies.     Medical diagnoses:    # S/p suicide attempt w/carbon monoxide poisoning:  Carboxyhemoglobin of 2.2% at outside hospital. Medically cleared for admission    # DMII:   A1C of 7.9 on admission in the context of medication nonadherence  - Continue Metformin 500 mg bid with meals  - BID BG checks  - Will notify medicine if persistently >180    # HTN:  - Hold PTA amlodipine 5 mg daily due to hypotension  - Will notify medicine if BP persistently >150/90    # Vitamin D deficiency:  Vitamin D of 13 on admission  - Ergocalciferol 50,000 IUs weekly    # Dyslipidemia:  Cholesterol 146, triglycerides 186, HDL 35, and LDL 74  - F/U as outpatient with PCP       Consults: Internal Medicine, appreciate recs        Dispo: unknown pending medication management and clinical stabilization    Safety Assessment:   Behavioral Orders   Procedures     Code 1 - Restrict to Unit     Routine Programming     As clinically indicated     Self Injury  Precaution     Status 15     Every 15 minutes.     Suicide precautions     Patients on Suicide Precautions should have a Combination Diet ordered that includes a Diet selection(s) AND a Behavioral Tray selection for Safe Tray - with utensils, or Safe Tray - NO utensils       -------------------------------------------------------------------------------------  I, Antonia Krissy, MS3, acted as a scribe for Dr. Kevin Vogel, PGY1.    I have reviewed and edited the documentation recorded by the scribe.  This documentation accurately reflects the services I personally performed and treatment decisions made by me in consultation with the attending physician Cali Doherty MD.    Kevin Vogel MD  Psychiatry PGY-1  [phone]      Attestation:  This patient has been seen and evaluated by me, Cali Doherty.  I have discussed this patient with the house staff team including the resident and medical student and I agree with the findings and plan in this note.    I have reviewed today's vital signs, medications, labs and imaging. Cali Doherty MD             Labs/Imaging since admission:         Lipid panel   Result Value Ref Range    Cholesterol 146 <200 mg/dL    Triglycerides 186 (H) <150 mg/dL    HDL Cholesterol 35 (L) >39 mg/dL    LDL Cholesterol Calculated 74 <100 mg/dL    Non HDL Cholesterol 111 <130 mg/dL   Vitamin D   Result Value Ref Range    Vitamin D Deficiency screening 13 (L) 20 - 75 ug/L   Hemoglobin A1c   Result Value Ref Range    Hemoglobin A1C 7.9 (H) 0 - 6.4 %   Creatinine   Result Value Ref Range    Creatinine 0.69 0.66 - 1.25 mg/dL    GFR Estimate >90 >60 mL/min/1.7m2    GFR Estimate If Black >90 >60 mL/min/1.7m2   Glucose by meter   Result Value Ref Range    Glucose 111 (H) 70 - 99 mg/dL   EKG 12-lead, complete   Result Value Ref Range    Interpretation ECG Click View Image link to view waveform and result

## 2018-04-12 VITALS
HEIGHT: 73 IN | BODY MASS INDEX: 26.04 KG/M2 | RESPIRATION RATE: 12 BRPM | HEART RATE: 61 BPM | WEIGHT: 196.5 LBS | OXYGEN SATURATION: 99 % | SYSTOLIC BLOOD PRESSURE: 96 MMHG | DIASTOLIC BLOOD PRESSURE: 42 MMHG | TEMPERATURE: 97.5 F

## 2018-04-12 LAB — GLUCOSE BLDC GLUCOMTR-MCNC: 124 MG/DL (ref 70–99)

## 2018-04-12 PROCEDURE — 90853 GROUP PSYCHOTHERAPY: CPT

## 2018-04-12 PROCEDURE — 25000132 ZZH RX MED GY IP 250 OP 250 PS 637: Performed by: STUDENT IN AN ORGANIZED HEALTH CARE EDUCATION/TRAINING PROGRAM

## 2018-04-12 PROCEDURE — H2032 ACTIVITY THERAPY, PER 15 MIN: HCPCS

## 2018-04-12 PROCEDURE — 25000132 ZZH RX MED GY IP 250 OP 250 PS 637

## 2018-04-12 PROCEDURE — 00000146 ZZHCL STATISTIC GLUCOSE BY METER IP

## 2018-04-12 PROCEDURE — 99238 HOSP IP/OBS DSCHRG MGMT 30/<: CPT | Mod: GC | Performed by: PSYCHIATRY & NEUROLOGY

## 2018-04-12 RX ORDER — ARIPIPRAZOLE 5 MG/1
5 TABLET ORAL DAILY
Qty: 30 TABLET | Refills: 0 | Status: SHIPPED | OUTPATIENT
Start: 2018-04-13

## 2018-04-12 RX ORDER — ERGOCALCIFEROL 1.25 MG/1
50000 CAPSULE, LIQUID FILLED ORAL
Qty: 7 CAPSULE | Refills: 0 | Status: SHIPPED | OUTPATIENT
Start: 2018-04-18

## 2018-04-12 RX ADMIN — ARIPIPRAZOLE 5 MG: 5 TABLET ORAL at 08:55

## 2018-04-12 RX ADMIN — METFORMIN HYDROCHLORIDE 500 MG: 500 TABLET ORAL at 08:55

## 2018-04-12 ASSESSMENT — ACTIVITIES OF DAILY LIVING (ADL)
LAUNDRY: WITH SUPERVISION
GROOMING: INDEPENDENT
ORAL_HYGIENE: INDEPENDENT
DRESS: INDEPENDENT

## 2018-04-12 NOTE — PLAN OF CARE
Problem: Depressive Symptoms  Goal: Depressive Symptoms  Signs and symptoms of listed problems will be absent or manageable.   Outcome: Adequate for Discharge Date Met: 04/12/18  Marcos discharged 1515 care of wife to return home-states he is feeling hopeful, although some anxiety regarding wife's impending job loss, having to find new home, new baby due in a month-does state he loves his job as -Marcos reviewed medication schedule and outpt tx plans and verbalized understanding-has 30 day supply of medications and printed instructions-Marcos requested previous medications be returned to security to be destroyed

## 2018-04-12 NOTE — DISCHARGE INSTRUCTIONS
Behavioral Discharge Planning and Instructions      Summary:  You were admitted on 4/10/2018  due to Suicide Attempt.  You were treated by Dr. Cali Doherty MD and discharged on 4/12/18 from [facility] to Home    Principal Diagnosis: Schizoaffective Disorder     Health Care Follow-up Appointments:     Dr. Turner- Tuesday April 17th 3:00pm (Psychiatry)   [facility]  [address] [place] [address]  Phone:  [phone] Fax:  [phone]     Mr. Mckenzie- Thursday April 19th 2:05pm (Primary Care Doctor)  [facility]    [address] [place] [address]   Ph: [phone] Fax:    Attend all scheduled appointments with your outpatient providers. Call at least 24 hours in advance if you need to reschedule an appointment to ensure continued access to your outpatient providers.   Major Treatments, Procedures and Findings:  You were provided with: a psychiatric assessment, medication evaluation and/or management, group therapy and milieu management    Symptoms to Report: increased confusion, losing more sleep, mood getting worse or thoughts of suicide    Early warning signs can include: increased depression or anxiety increased thoughts or behaviors of suicide or self-harm  increased unusual thinking, such as paranoia or hearing voices    Safety and Wellness:  Take all medicines as directed.  Make no changes unless your doctor suggests them.      Follow treatment recommendations.  Refrain from alcohol and non-prescribed drugs.  If there is a concern for safety, call 911.    Resources:   Crisis Intervention: 564.407.1081 or 972-799-3432 (TTY: 168.942.3880).  Call anytime for help.  National [place] on Mental Illness (www.[place].nilsa.org): 851.247.2855 or 854-497-3081.  Suicide Awareness Voices of Education (SAVE) (www.save.org): 948-876-RNBT (5178)  National Suicide Prevention Line (www.mentalhealthmn.org): 335-397-WDMV (9810)  Mental Health  "Consumer/Survivor Network of [place] (www.mhcsn.net): 497.609.2409 or 482-034-8503  Mental Health Association of [place] (www.mentalhealth.org): 648.382.4123 or 838-571-8150  [facility] Crisis Response - Adult 310 599-1226  Text 4 Life: txt \"LIFE\" to [address]346 for immediate support and crisis intervention  Crisis text line: Text \"[place]\" to 661151. Free, confidential, 24/7.  Crisis Intervention: 955.133.2057 or 003-106-9442. Call anytime for help.   [facility] Mental Health Crisis Response Team - Child: [phone]    The treatment team has appreciated the opportunity to work with you.     If you have any questions or concerns our unit number is [phone]  You may be receiving a follow-up phone call within the next three days from a representative from behavioral health.          "

## 2018-04-12 NOTE — PROGRESS NOTES
Pt in milieu most of shift, bright affect, states mood as 9.5/10, states they are just waiting for DC and trying to stay positive. Contracts for safety.      04/11/18 2100   Behavioral Health   Hallucinations denies / not responding to hallucinations   Thinking intact   Orientation person, disoriented;place, disoriented;date, disoriented;time, disoriented   Memory baseline memory   Insight insight appropriate to situation   Judgement intact   Affect full range affect   Mood mood is calm   Physical Appearance/Attire attire appropriate to age and situation   Hygiene well groomed   Suicidality other (see comments)  (denies)   1. Wish to be Dead No   2. Non-Specific Active Suicidal Thoughts  No   Self Injury other (see comment)  (denies )   Activity other (see comment)  (in milieu, social )   Speech clear;coherent   Medication Sensitivity no observed side effects;other (see comment)  (dizziness, low bp in previous shift. )   Psychomotor / Gait balanced;steady   Psycho Education   Type of Intervention 1:1 intervention   Response participates, initiates socially appropriate   Hours 0.5   Treatment Detail check in    Group Therapy Session   Group Attendance attended group session   Time Session Began 1730   Time Session Ended 1800   Total Time (minutes) 30   Group Type community   Patient Participation/Contribution cooperative with task   Activities of Daily Living   Hygiene/Grooming independent   Oral Hygiene independent   Dress independent   Room Organization independent   Activity   Activity Assistance Provided independent

## 2018-04-12 NOTE — DISCHARGE SUMMARY
"    ----------------------------------------------------------------------------------------------------------  [facility], [place]   Discharge Summary  Hospital Day #2      Jae Winter MRN# [id number]   Age: 27 year old YOB: 1990     Date of Admission:  4/10/2018  Date of Discharge:  4/12/2018  Admitting Physician:  Cali Doherty MD  Discharge Physician:  Cali Doherty MD         Event Leading to Hospitalization:     This patient is a 27 year old -American male with a history of schizoaffective disorder, bipolar type who presented to [facility] ED on 4/8/2018 due to suicide attempt via carbon monoxide poisoning.  Suicide attempt was interrupted by police after the patient called crisis hotline.  Had run a tube from exhaust pipe into his vehicle and states he had sat in the car for 2 hours before calling [employer].  Has stated that his symptoms of depression, anhedonia, and emotional overwhelm have been worsening and he has been considering suicide for several months.  Psychosocial stressors include uncertainty in housing (his landlord is selling the property) and his wife being pregnant.  Denies substance abuse concerns, as he states he knows drug use would exacerbate his psychiatric symptoms.  Denied AH or other psychotic hallmarks at ED but was noted to seem internally preoccupied.     After [facility] to [facility], the patient was noted to be calm, cooperative, affect mildly constricted, however there did not seem to be thought blocking and the patient did not seem to be responding to internal stimuli.  He explained that he had a history of \"so many\" admissions as a youth and had been diagnosed with bipolar disorder, in addition to multiple suicide attempts, but that as he aged there was a long period of time without admissions up until a 2015 suicide attempt (also via CO poisoning) and the aforementioned admission to [facility], at which time he " "received the schizoaffective diagnosis.  In terms of medications he states that he took them for a while, specifically recalling risperidone and mentioning the antidepressant, the name of which he cannot recall, but stopped several months ago \"not because I wanted to, just because they were making me too drowsy to work.\"     Regarding his recent difficulties, the stressors which were detailed above were reiterated.  Stated that his second daughter is due to be born in one month, and that he and his wife will have to be out of their home by June.  Explained that things have been piling up, one after the next, and that several weeks ago he began to ask himself: \"Does anyone even care?, does anyone give a f**k? - I mean, I know people have their own problems, but it just feels like nobody cares.\"  Stated that he tried to be solution focused but became increasingly discouraged, and thoughts of suicide became increasingly prominent in his mind.  A similar sentiment to that above was noted when the patient was asked about management of his medical conditions, DMII and HTN - he stated that he's been going to a particular [facility] clinic, but that he wants to find a new PCP because \"I know everyone is just trying to make a linda but it's like they just really didn't care - I didn't like that vibe - it's like they just weren't very concerned, and I don't like that.\"  Apart from mood and existential complaint, the patient was absent endorsed hallmarks of psychosis or alee.  He stated that his sleep has been fine, and while he allows worry, denied a history of panic.  Finally, while he denied a history of abuse, he did allow numerous traumatic experiences over the course of his life, which he is sometimes reminded of in dreams, and which he attributes to the environments/neighborhoods in which he has lived.    See Admission note by Cali Doherty MD, on 4/10/2018 for additional details.          Diagnoses:     # " Schizoaffective disorder, bipolar type, current episode depressed          Labs:     Lipid panel: Cholesterol 146, Triglycerides 186, HDL 35, LDL 74  Vitamin D 13  A1C 7.9  Creatinine 0.69  B12 804  Folate 16.6  EKG with NSR and QTC of 388         Consults:     Internal Medicine for management of HTN and MD2         Hospital Course:     Diagnostic Formulation: Jae Winter is a 27 year old male with a history of Schizoaffective Disorder, Bipolar Type who presented to the [facility] ED following suicide attempt via carbon monoxide poisoning in the context of worsening depression. The patient's last psychiatric hospitalization was in 2016. He has a history of schizoaffective disorder bipolar type, which was diagnosed after an episode of alee with psychosis. However, this was in the context of THC use, which complicates the diagnostic picture. He has no history of psychosis since this initial episode. The patient is currently followed by OPP Tali Segura, however has not seen her since August of 2017 . Family history is notable for psychosis in an aunt. Current psychosocial stressors include unstable housing, wife losing her job, and impending birth of his third child. The patient denies drug abuse or self injurious behaviors. The MSE is notable for calm, cooperative patient with constricted affect, and a logical, linear thought process. Differential includes schizoaffective disorder, current depressive episode, vs bipolar disorder current depressive episode.      Psychiatric Course: Jae [facility] was [facility] as a voluntary patient for hospitalization following a suicide attempt. He shared that he had planned the suicide attempt in advance, and he only abandoned it after it was clear that it would not work. He also recorded a suicide message on his phone, which he later deleted. Upon admission, the patient reported complete resolution of his suicidal ideation. He regreted the suicide  attempt, and he was amenable to stabilization and treatment of his depression. Adverse side effects have been a major barrier to medication adherence in the past, so he was reluctant to start medications. He was agreeable to starting Abilify. He experienced lightheadedness on 10 mg, so this was decreased to 5 mg daily. Soon after admission, he noted marked improvement in his mood. He demonstrated future oriented thinking, and hope for the future. He had made plans to overcome some of the life stressors he was facing, including finding housing. Arrangements were made for the patient to establish care with a new outpatient Psychiatrist.     Jae Winter was discharged to home with family. At the time of discharge Jae Winter was determined to not be a danger to himself or others.    Medical Course:  PTA amlodipine 5 mg daily and metformin 500 mg bid were restarted after a long period of non-adherence. He experienced hypotension after starting abilify, so amlodipine was discontinued. Patient was instructed to follow up with his PCP for further management. He was also started on weekly Vitamin D, after his vitamin D was found to be low at 13.       Risk Assessment:  Jae Winter has notable risk factors for self-harm, including psychosis and previous suicide attempts. However, risk is mitigated by commitment to family and ability to volunteer a safety plan.Additional steps taken to minimize risk include: close follow up with outpatient providers. Therefore, based on all available evidence including the factors cited above, Jae Winter does not appear to be at imminent risk for self-harm, and is appropriate for outpatient level of care.     This document serves as a transfer of care to Jae Winter's outpatient providers.         Discharge Medications:     Abilify 5 mg daily  Vitamin D 50,000 units qWeekly  Metformin 500 mg bid with meals           Psychiatric Examination:   BP 96/42  Pulse 61   "Temp 97.5  F (36.4  C)  Resp 12  Ht 1.854 m (6' 1\")  Wt 89.1 kg (196 lb 8 oz)  SpO2 99%  BMI 25.93 kg/m2  Appearance:  awake, alert, well groomed, casually dressed  Attitude: Pleasant and cooperative  Eye Contact:  good  Mood:  better  Affect:  appropriate and in normal range, smiling at times  Speech:  clear, coherent and normal prosody  Psychomotor Behavior:  No psychomotor slowing  Thought Process:  logical, linear and goal oriented  Associations:  no loose associations  Thought Content:  no evidence of suicidal ideation or homicidal ideation  Insight:  good  Judgment:  intact  Oriented to:  time, person, and place  Attention Span and Concentration:  intact  Recent and Remote Memory:  intact  Language: Conversant in English  Fund of Knowledge: appropriate  Muscle Strength and Tone: grossly normal  Gait and Station: Normal         Discharge Plan:   Health Care Follow-up Appointments:      Dr. Turner-  3:00pm (Psychiatry)   [facility]  [address] [place] [address] [address] [place] [address]  Phone:  [phone] Fax:  [phone]      Mr. Mckenzie-  2:05pm (Primary Care Doctor)  [facility]    [address] [facility] [address] [place] [address]   Ph: [phone] Fax:  --------------------------------------------------------------------------------------------------------------------------------  Pt seen and discussed with my attending, MD Kevin Kidd MD  Psychiatry PGY1      Attestation:   The patient has been seen and evaluated by me,  Cali Doherty. I have examined the patient today and reviewed the discharge plan with the resident and medical student. I agree with the final assessment and plan, as noted in the discharge summary. I have reviewed today's vital signs, medications, labs and imaging.  Total time discharge plannin minutes  Cali Doherty MD          "

## 2018-08-24 ENCOUNTER — HOSPITAL ENCOUNTER (EMERGENCY)
Facility: CLINIC | Age: 28
Discharge: HOME | End: 2018-08-25
Attending: EMERGENCY MEDICINE | Admitting: EMERGENCY MEDICINE
Payer: COMMERCIAL

## 2018-08-24 DIAGNOSIS — Z91.148 DRUG NONCOMPLIANCE: ICD-10-CM

## 2018-08-24 DIAGNOSIS — F40.9 FEARFULNESS: ICD-10-CM

## 2018-08-24 DIAGNOSIS — Z91.89: Primary | ICD-10-CM

## 2018-08-24 DIAGNOSIS — F31.81 BIPOLAR 2 DISORDER (H): ICD-10-CM

## 2018-08-24 PROCEDURE — 25000132 ZZH RX MED GY IP 250 OP 250 PS 637: Performed by: EMERGENCY MEDICINE

## 2018-08-24 PROCEDURE — 99283 EMERGENCY DEPT VISIT LOW MDM: CPT

## 2018-08-24 PROCEDURE — 00000146 ZZHCL STATISTIC GLUCOSE BY METER IP

## 2018-08-24 RX ORDER — OLANZAPINE 5 MG/1
15 TABLET, ORALLY DISINTEGRATING ORAL ONCE
Status: COMPLETED | OUTPATIENT
Start: 2018-08-24 | End: 2018-08-24

## 2018-08-24 RX ADMIN — OLANZAPINE 15 MG: 5 TABLET, ORALLY DISINTEGRATING ORAL at 23:36

## 2018-08-24 ASSESSMENT — ENCOUNTER SYMPTOMS
SLEEP DISTURBANCE: 1
NERVOUS/ANXIOUS: 1

## 2018-08-24 NOTE — ED AVS SNAPSHOT
[facility] Emergency Department    [address]    [facility] [address]    Phone:  [phone]    Fax:  [phone]                                       Jae Winter   MRN: [id number]    Department:  [facility] Emergency Department   Date of Visit:  8/24/2018           After Visit Summary Signature Page     I have received my discharge instructions, and my questions have been answered. I have discussed any challenges I see with this plan with the nurse or doctor.    ..........................................................................................................................................  Patient/Patient Representative Signature      ..........................................................................................................................................  Patient Representative Print Name and Relationship to Patient    ..................................................               ................................................  Date                                            Time    ..........................................................................................................................................  Reviewed by Signature/Title    ...................................................              ..............................................  Date                                                            Time          22EPIC Rev 08/18

## 2018-08-24 NOTE — ED AVS SNAPSHOT
[facility] Emergency Department    [address]    [facility] [address]    Phone:  [phone]    Fax:  [phone]                                       aJe Winter   MRN: [id number]    Department:  [facility] Emergency Department   Date of Visit:  8/24/2018           Patient Information     Date Of Birth          1990        Your diagnoses for this visit were:     Bipolar 2 disorder (H)     Drug noncompliance     Fearfulness     At risk for intimate partner abuse        You were seen by Peter Ashraf MD and Husam Gutierrez MD.      Follow-up Information     Follow up with Outside Primary Provider .    Why:  Follow up mental health medications.         Follow up with [facility].    Specialty:  EMERGENCY MEDICINE    Why:  As needed, If symptoms worsen    Contact information:    [address]  [facility] [address]  [phone]        Discharge Instructions       He should continue to take your Abilify as previously prescribed.  But it is also important for you to follow-up with your prescribing doctor to discuss whether you think this medication is effective for you or not.  He should return to the emergency department at any point if you feel like harming herself or harming anyone else.    24 Hour Appointment Hotline       To make an appointment at any [facility] clinic, call [phone] ([phone]). If you don't have a family doctor or clinic, we will help you find one. [facility] clinics are conveniently located to serve the needs of you and your family.             Review of your medicines      Our records show that you are taking the medicines listed below. If these are incorrect, please call your family doctor or clinic.        Dose / Directions Last dose taken    ARIPiprazole 5 MG tablet   Commonly known as:  ABILIFY   Dose:  5 mg   Quantity:  30 tablet        Take 1 tablet (5 mg) by mouth daily    Refills:  0        DRISDOL 28798 units Caps   Dose:  19612 Units   Quantity:  7 capsule        Take 50,000 Units by mouth every 7 days   Refills:  0        metFORMIN 500 MG tablet   Commonly known as:  GLUCOPHAGE   Dose:  500 mg   Quantity:  60 tablet        Take 1 tablet (500 mg) by mouth 2 times daily (with meals)   Refills:  0                Procedures and tests performed during your visit     Glucose by meter    Glucose monitor nursing POCT      Orders Needing Specimen Collection     None      Pending Results     No orders found for last 3 day(s).            Pending Culture Results     No orders found for last 3 day(s).            Pending Results Instructions     If you had any lab results that were not finalized at the time of your Discharge, you can call the ED Lab Result RN at [phone]. You will be contacted by this team for any positive Lab results or changes in treatment. The nurses are available 7 days a week from 10A to 6:30P.  You can leave a message 24 hours per day and they will return your call.        Test Results From Your Hospital Stay        8/25/2018 12:54 AM      Component Results     Component Value Ref Range & Units Status    Glucose 176 (H) 70 - 99 mg/dL Final                Clinical Quality Measure: Blood Pressure Screening     Your blood pressure was checked while you were in the emergency department today. The last reading we obtained was  BP: 118/55 . Please read the guidelines below about what these numbers mean and what you should do about them.  If your systolic blood pressure (the top number) is less than 120 and your diastolic blood pressure (the bottom number) is less than 80, then your blood pressure is normal. There is nothing more that you need to do about it.  If your systolic blood pressure (the top number) is 120-139 or your diastolic blood pressure (the bottom number) is 80-89, your blood pressure may be higher than it should be. You should have your blood pressure  "rechecked within a year by a primary care provider.  If your systolic blood pressure (the top number) is 140 or greater or your diastolic blood pressure (the bottom number) is 90 or greater, you may have high blood pressure. High blood pressure is treatable, but if left untreated over time it can put you at risk for heart attack, stroke, or kidney failure. You should have your blood pressure rechecked by a primary care provider within the next 4 weeks.  If your provider in the emergency department today gave you specific instructions to follow-up with your doctor or provider even sooner than that, you should follow that instruction and not wait for up to 4 weeks for your follow-up visit.        Thank you for choosing [facility]       Thank you for choosing [facility] for your care. Our goal is always to provide you with excellent care. Hearing back from our patients is one way we can continue to improve our services. Please take a few minutes to complete the written survey that you may receive in the mail after you visit with us. Thank you!        [employer] Information     [employer] lets you send messages to your doctor, view your test results, renew your prescriptions, schedule appointments and more. To sign up, go to [email or web address] . Click on \"Log in\" on the left side of the screen, which will take you to the Welcome page. Then click on \"Sign up Now\" on the right side of the page.     You will be asked to enter the access code listed below, as well as some personal information. Please follow the directions to create your username and password.     Your access code is: [email or web address]  Expires: 2018  9:58 AM     Your access code will  in 90 days. If you need help or a new code, please call your [facility] clinic or [phone].        Care EveryWhere ID     This is your Care EveryWhere ID. This could be used by other organizations to access your [facility] medical records  [id number]        Equal Access to " Services     [facility]: Tanisha Foley, wagasperda luqadaha, qaybta kayu roy. So [facility] [phone].    ATENCIÓN: Si habla español, tiene a thibodeaux disposición servicios gratuitos de asistencia lingüística. Llame al [phone].    We comply with applicable federal civil rights laws and [place] laws. We do not discriminate on the basis of race, color, national origin, age, disability, sex, sexual orientation, or gender identity.            After Visit Summary       This is your record. Keep this with you and show to your community pharmacist(s) and doctor(s) at your next visit.

## 2018-08-25 VITALS
SYSTOLIC BLOOD PRESSURE: 118 MMHG | DIASTOLIC BLOOD PRESSURE: 55 MMHG | HEART RATE: 59 BPM | BODY MASS INDEX: 26.39 KG/M2 | OXYGEN SATURATION: 98 % | WEIGHT: 200 LBS | RESPIRATION RATE: 18 BRPM | TEMPERATURE: 98.7 F

## 2018-08-25 LAB — GLUCOSE BLDC GLUCOMTR-MCNC: 176 MG/DL (ref 70–99)

## 2018-08-25 NOTE — ED NOTES
Bed: ED07  Expected date: 8/24/18  Expected time: 10:53 PM  Means of arrival: Ambulance  Comments:  Deven Garcia
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
## 2018-08-25 NOTE — ED TRIAGE NOTES
Pt presents via EMS.  States he hasn't slept in 2 days, has been feeling paranoid.  Bipolar disorder and schitzophrenia.  States he has not been taking his medications on a daily basis, states he often forgets.  States he was home alone with kids which he was uncomfortable with due to his mental state.  Alert and orientated, ABCs intact.  Pt compliant at this time.
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
## 2018-08-25 NOTE — ED NOTES
Talked to  regarding housing situation.  Resources given. Pt declined shelter.  Feels safe currently and will stay at a friend's place.     Husam Gutierrez MD  08/25/18 8732

## 2018-08-25 NOTE — DISCHARGE INSTRUCTIONS
He should continue to take your Abilify as previously prescribed.  But it is also important for you to follow-up with your prescribing doctor to discuss whether you think this medication is effective for you or not.  He should return to the emergency department at any point if you feel like harming herself or harming anyone else.

## 2018-08-25 NOTE — ED NOTES
For some reason, glucometer did not populate results into EPIC, however blood glucose at this time was 176mg/dL

## 2019-05-19 ENCOUNTER — HOSPITAL ENCOUNTER (EMERGENCY)
Facility: CLINIC | Age: 29
Discharge: HOME | End: 2019-05-19
Attending: EMERGENCY MEDICINE | Admitting: EMERGENCY MEDICINE
Payer: COMMERCIAL

## 2019-05-19 VITALS
SYSTOLIC BLOOD PRESSURE: 148 MMHG | OXYGEN SATURATION: 96 % | HEART RATE: 83 BPM | TEMPERATURE: 98.7 F | WEIGHT: 175 LBS | DIASTOLIC BLOOD PRESSURE: 81 MMHG | BODY MASS INDEX: 23.7 KG/M2 | RESPIRATION RATE: 20 BRPM | HEIGHT: 72 IN

## 2019-05-19 DIAGNOSIS — L05.91 PILONIDAL CYST: ICD-10-CM

## 2019-05-19 PROCEDURE — 10080 I&D PILONIDAL CYST SIMPLE: CPT

## 2019-05-19 PROCEDURE — 99283 EMERGENCY DEPT VISIT LOW MDM: CPT | Mod: 25

## 2019-05-19 RX ORDER — CEPHALEXIN 500 MG/1
500 CAPSULE ORAL 4 TIMES DAILY
Qty: 28 CAPSULE | Refills: 0 | Status: SHIPPED | OUTPATIENT
Start: 2019-05-19 | End: 2019-05-26

## 2019-05-19 RX ORDER — LIDOCAINE HYDROCHLORIDE AND EPINEPHRINE 10; 10 MG/ML; UG/ML
INJECTION, SOLUTION INFILTRATION; PERINEURAL
Status: DISCONTINUED
Start: 2019-05-19 | End: 2019-05-19 | Stop reason: HOSPADM

## 2019-05-19 RX ORDER — IBUPROFEN 600 MG/1
600 TABLET, FILM COATED ORAL EVERY 6 HOURS PRN
Qty: 20 TABLET | Refills: 0 | Status: SHIPPED | OUTPATIENT
Start: 2019-05-19

## 2019-05-19 ASSESSMENT — ENCOUNTER SYMPTOMS
FEVER: 0
NAUSEA: 0
WOUND: 1
GASTROINTESTINAL NEGATIVE: 1
DIARRHEA: 0
VOMITING: 0

## 2019-05-19 ASSESSMENT — MIFFLIN-ST. JEOR: SCORE: 1796.79

## 2019-05-19 NOTE — ED AVS SNAPSHOT
[facility] Emergency Department  [address]  [facility] [address]  Phone:  [phone]  Fax:  [phone]                                    Jae Winter   MRN: [id number]    Department:  [facility] Emergency Department   Date of Visit:  5/19/2019           After Visit Summary Signature Page    I have received my discharge instructions, and my questions have been answered. I have discussed any challenges I see with this plan with the nurse or doctor.    ..........................................................................................................................................  Patient/Patient Representative Signature      ..........................................................................................................................................  Patient Representative Print Name and Relationship to Patient    ..................................................               ................................................  Date                                   Time    ..........................................................................................................................................  Reviewed by Signature/Title    ...................................................              ..............................................  Date                                               Time          22EPIC Rev 08/18

## 2019-05-19 NOTE — ED TRIAGE NOTES
Pt to ER with c/o lump to area above buttocks, pt had this  A year ago but went away now back and larger and painful

## 2019-05-19 NOTE — ED PROVIDER NOTES
History     Chief Complaint:  Cyst    HPI   Jae Winter is a 28 year old male with a history of pilonidal cyst and prediabetes who presents to the emergency department today for evaluation of a cyst. The patient reports that about a year ago he had a cyst in his lower back, but the provider he went to see for it refused to pop it, allowing it to pop itself. Here, the patient notes the cyst returned in the same exact spot roughly 2 days ago, and wants it to be popped. He denies fever, nausea and vomiting, diarrhea, or any pain associated with bowel movements.  He reports increasing redness to the area and pain.  He has not taken anything for pain.  No history IVDA, abscesses    Allergies:  No Known Drug Allergies     Medications:    Abilify  Metformin  Drisdol    Past Medical History:    Bipolar disorder  Prediabetes    Past Surgical History:    Surgical history reviewed. No pertinent surgical history.     Family History:    Family history reviewed. No pertinent family history.     Social History:  The patient was not accompanied to the ED..  Smoking Status: Never Smoker  Smokeless Tobacco: Never Used  Alcohol Use: Positive  Drug Use: Positive    Type: Marijuana  PCP: Deven Broussard [facility]   Marital Status:          Review of Systems   Constitutional: Negative for fever.   Gastrointestinal: Negative.  Negative for diarrhea, nausea and vomiting.   Skin: Positive for wound.   All other systems reviewed and are negative.      Physical Exam     Patient Vitals for the past 24 hrs:   BP Temp Temp src Pulse Resp SpO2 Height Weight   05/19/19 0616 148/81 98.7  F (37.1  C) Oral 83 20 96 % 1.829 m (6') 79.4 kg (175 lb)        Physical Exam  Nursing note and vitals reviewed.  Constitutional: Well nourished. Resting comfortably.   Eyes: Conjunctiva normal.  Pupils are equal, round, and reactive to light.   ENT: Nose normal. Mucous membranes pink and moist.    Neck: Normal range of motion.  CVS:  Normal rate, regular rhythm.  Normal heart sounds.  No murmur.  Pulmonary: Lungs clear to auscultation bilaterally. No wheezes/rales/rhonchi.  GI: Abdomen soft. Nontender, nondistended. No rigidity or guarding.    MSK: No calf tenderness or swelling.  Neuro: Alert. Follows simple commands.  Skin: Skin is warm and dry. No rash noted. 3cm pilonoidal cyst with surrounding warmth, erythema, mild fluctuance, no crepitance.   Psychiatric: Normal affect.       Emergency Department Course     Procedures:  PROCEDURE: Incision and Drainage    LOCATIONS: Lower back, pilonidal cyst    ANESTHESIA: Local block using 1% lidocaine with Epinephrine, 3 ml.     PREPARATION: Cleansed with betadine    PROCEDURE: Area was incised with straight with single incision. Wound treatment included moderate amount purulent drainage expressed with breaking of loculations and irrigation. Appropriate dressing was applied to cover the area.    Emergency Department Course:    0613 Nursing notes and vitals reviewed.    0625 I performed an exam of the patient as documented above.     0640 Incision and drainage procedure was completed.    0658 I personally reviewed answered all related questions prior to discharge.    Impression & Plan      Medical Decision Making:  Jae Winter is a 28 year old male who presents to the emergency department today for evaluation of pilonidal cyst. Concern for early signs of cellulitis as well. He is not toxic on exam. Bedside I & D performed with copious amounts of purulent drainage. The patient reports symptom improvement. I did discuss with patient my concern for possible recurrence should he not formerly have cyst capsule removed. He was given general surgery follow up within 1 week as well as plans for PCP wound evaluation in the next 2-3 days. He will be discharged home with keflex, given my concern for early cellulitis in the area as well as history of prediabetes and concern for immunocompromised state. I  recommended ibuprofen for pain control. Wound dressing application discussed.  He was directed to return to the ED should he developed fever, increased redness, worsening drainage, or should symptoms worsen or change.     Diagnosis:    ICD-10-CM    1. Pilonidal cyst L05.91      Disposition:   The patient is discharged to home.     Discharge Medications:           START taking      Dose / Directions   cephALEXin 500 MG capsule  Commonly known as:  KEFLEX      Dose:  500 mg  Take 1 capsule (500 mg) by mouth 4 times daily for 7 days  Quantity:  28 capsule  Refills:  0     ibuprofen 600 MG tablet  Commonly known as:  ADVIL/MOTRIN      Dose:  600 mg  Take 1 tablet (600 mg) by mouth every 6 hours as needed for moderate pain  Quantity:  20 tablet  Refills:  0           Where to get your medicines      Some of these will need a paper prescription and others can be bought over the counter. Ask your nurse if you have questions.    Bring a paper prescription for each of these medications    cephALEXin 500 MG capsule    ibuprofen 600 MG tablet         Scribe Disclosure:  Jaun VALENCIA, am serving as a scribe at 6:29 AM on 5/19/2019 to document services personally performed by Andria Gonzales DO based on my observations and the provider's statements to me.     [facility] EMERGENCY DEPARTMENT       Andria Gonzales DO  05/19/19 9382

## 2019-05-19 NOTE — DISCHARGE INSTRUCTIONS
I reviewed with the patient signs and symptoms of wound infection: worsening redness, swelling, pain, fever, streaking of the skin, and if this is a concern, to return for provider evaluation. Patient expressed understanding of instructions.

## 2022-03-18 ENCOUNTER — LAB REQUISITION (OUTPATIENT)
Dept: LAB | Facility: CLINIC | Age: 32
End: 2022-03-18

## 2022-03-18 DIAGNOSIS — E78.2 MIXED HYPERLIPIDEMIA: ICD-10-CM

## 2022-03-18 DIAGNOSIS — I10 ESSENTIAL (PRIMARY) HYPERTENSION: ICD-10-CM

## 2022-03-18 PROCEDURE — 82310 ASSAY OF CALCIUM: CPT | Performed by: PHYSICIAN ASSISTANT

## 2022-03-18 PROCEDURE — 80061 LIPID PANEL: CPT | Performed by: PHYSICIAN ASSISTANT

## 2022-03-19 LAB
ANION GAP SERPL CALCULATED.3IONS-SCNC: 15 MMOL/L (ref 5–18)
BUN SERPL-MCNC: 12 MG/DL (ref 8–22)
CALCIUM SERPL-MCNC: 9.4 MG/DL (ref 8.5–10.5)
CHLORIDE BLD-SCNC: 102 MMOL/L (ref 98–107)
CHOLEST SERPL-MCNC: 85 MG/DL
CO2 SERPL-SCNC: 22 MMOL/L (ref 22–31)
CREAT SERPL-MCNC: 0.8 MG/DL (ref 0.7–1.3)
FASTING STATUS PATIENT QL REPORTED: ABNORMAL
GFR SERPL CREATININE-BSD FRML MDRD: >90 ML/MIN/1.73M2
GLUCOSE BLD-MCNC: 245 MG/DL (ref 70–125)
HDLC SERPL-MCNC: 33 MG/DL
LDLC SERPL CALC-MCNC: 40 MG/DL
POTASSIUM BLD-SCNC: 4.3 MMOL/L (ref 3.5–5)
SODIUM SERPL-SCNC: 139 MMOL/L (ref 136–145)
TRIGL SERPL-MCNC: 58 MG/DL